# Patient Record
Sex: MALE | Race: OTHER | HISPANIC OR LATINO | URBAN - METROPOLITAN AREA
[De-identification: names, ages, dates, MRNs, and addresses within clinical notes are randomized per-mention and may not be internally consistent; named-entity substitution may affect disease eponyms.]

---

## 2018-03-15 ENCOUNTER — OUTPATIENT (OUTPATIENT)
Dept: OUTPATIENT SERVICES | Facility: HOSPITAL | Age: 68
LOS: 1 days | Discharge: HOME | End: 2018-03-15
Payer: MEDICARE

## 2018-03-15 PROBLEM — Z00.00 ENCOUNTER FOR PREVENTIVE HEALTH EXAMINATION: Status: ACTIVE | Noted: 2018-03-15

## 2018-03-15 PROCEDURE — 33282: CPT

## 2018-03-15 PROCEDURE — C1764: CPT

## 2019-04-01 ENCOUNTER — APPOINTMENT (OUTPATIENT)
Dept: HEART AND VASCULAR | Facility: CLINIC | Age: 69
End: 2019-04-01
Payer: MEDICARE

## 2019-04-01 VITALS — SYSTOLIC BLOOD PRESSURE: 154 MMHG | HEART RATE: 46 BPM | DIASTOLIC BLOOD PRESSURE: 95 MMHG

## 2019-04-01 DIAGNOSIS — E78.5 HYPERLIPIDEMIA, UNSPECIFIED: ICD-10-CM

## 2019-04-01 DIAGNOSIS — R55 SYNCOPE AND COLLAPSE: ICD-10-CM

## 2019-04-01 DIAGNOSIS — I10 ESSENTIAL (PRIMARY) HYPERTENSION: ICD-10-CM

## 2019-04-01 PROCEDURE — 99214 OFFICE O/P EST MOD 30 MIN: CPT | Mod: 25

## 2019-04-01 PROCEDURE — 93285 PRGRMG DEV EVAL SCRMS IP: CPT

## 2019-04-01 RX ORDER — AMLODIPINE BESYLATE 5 MG/1
TABLET ORAL
Refills: 0 | Status: ACTIVE | COMMUNITY

## 2019-04-01 RX ORDER — ASPIRIN 81 MG
81 TABLET, DELAYED RELEASE (ENTERIC COATED) ORAL
Refills: 0 | Status: ACTIVE | COMMUNITY

## 2019-04-01 RX ORDER — METOPROLOL SUCCINATE 100 MG/1
TABLET, EXTENDED RELEASE ORAL
Refills: 0 | Status: ACTIVE | COMMUNITY

## 2019-04-01 RX ORDER — ATORVASTATIN CALCIUM 80 MG/1
TABLET, FILM COATED ORAL
Refills: 0 | Status: ACTIVE | COMMUNITY

## 2019-04-05 NOTE — REASON FOR VISIT
[Follow-up Device Check] : follow-up device check visit [Arrhythmias (seen on stored data)] : arrhythmias seen on stored data [[employer] ] : provided by [employer]   [FreeTextEntry1] : [id number] [FreeTextEntry2] : [place]

## 2019-04-05 NOTE — DISCUSSION/SUMMARY
[FreeTextEntry1] : Mr. Kenny is a 68 year-old gentleman with a history of syncope s/p ILR placement 9/2018.  Sinus pauses noted on interrogation today.  Given his history, I have recommended he undergo dual chamber PPM placement to reduce risk for recurrent syncope.  The indication and procedure were discussed with him in great detail via  services.  He states he will consider this and follow-up with myself and Dr. Lerner for further consideration.  All questions were answered.

## 2019-04-05 NOTE — HISTORY OF PRESENT ILLNESS
[de-identified] : Mr. Kenny is a 68 year-old [place] speaking gentleman with a past medical history significant for bicuspid aortic valve, HTN, HLD, ascending aortic aneurysm, vertigo and syncope s/p ILR placement.  He presents today for routine follow-up and denies any dizziness, presyncope or syncope since device implant.  Of ntoe, remote monitoring noted a 0.1% burden of AT/AF; no EGMs available for review.

## 2019-04-05 NOTE — PROCEDURE
[de-identified] : MDT REVEAL LINQ\par BATTERY STATUS GOOD\par ADEQUATE SENSING\par 2 Pause events - EGMs c/w sinus pauses, 3 & 4 seconds\par Unable to recall why he pressed symptom activator- EGMs c/w SR

## 2019-09-10 ENCOUNTER — RESULT REVIEW (OUTPATIENT)
Age: 69
End: 2019-09-10